# Patient Record
Sex: FEMALE | Race: WHITE | NOT HISPANIC OR LATINO | Employment: OTHER | URBAN - METROPOLITAN AREA
[De-identification: names, ages, dates, MRNs, and addresses within clinical notes are randomized per-mention and may not be internally consistent; named-entity substitution may affect disease eponyms.]

---

## 2018-09-26 ENCOUNTER — APPOINTMENT (OUTPATIENT)
Dept: PREADMISSION TESTING | Facility: HOSPITAL | Age: 68
End: 2018-09-26

## 2018-09-26 VITALS — WEIGHT: 168.65 LBS | BODY MASS INDEX: 28.79 KG/M2 | HEIGHT: 64 IN

## 2018-09-26 LAB
ANION GAP SERPL CALCULATED.3IONS-SCNC: 10 MMOL/L (ref 3–11)
BUN BLD-MCNC: 16 MG/DL (ref 9–23)
BUN/CREAT SERPL: 18 (ref 7–25)
CALCIUM SPEC-SCNC: 9.2 MG/DL (ref 8.7–10.4)
CHLORIDE SERPL-SCNC: 104 MMOL/L (ref 99–109)
CO2 SERPL-SCNC: 25 MMOL/L (ref 20–31)
CREAT BLD-MCNC: 0.89 MG/DL (ref 0.6–1.3)
DEPRECATED RDW RBC AUTO: 47.8 FL (ref 37–54)
ERYTHROCYTE [DISTWIDTH] IN BLOOD BY AUTOMATED COUNT: 14.7 % (ref 11.3–14.5)
GFR SERPL CREATININE-BSD FRML MDRD: 63 ML/MIN/1.73
GLUCOSE BLD-MCNC: 167 MG/DL (ref 70–100)
HCT VFR BLD AUTO: 36.7 % (ref 34.5–44)
HGB BLD-MCNC: 11.7 G/DL (ref 11.5–15.5)
MCH RBC QN AUTO: 28.5 PG (ref 27–31)
MCHC RBC AUTO-ENTMCNC: 31.9 G/DL (ref 32–36)
MCV RBC AUTO: 89.5 FL (ref 80–99)
PLATELET # BLD AUTO: 236 10*3/MM3 (ref 150–450)
PMV BLD AUTO: 10.2 FL (ref 6–12)
POTASSIUM BLD-SCNC: 4.4 MMOL/L (ref 3.5–5.5)
RBC # BLD AUTO: 4.1 10*6/MM3 (ref 3.89–5.14)
SODIUM BLD-SCNC: 139 MMOL/L (ref 132–146)
WBC NRBC COR # BLD: 7.26 10*3/MM3 (ref 3.5–10.8)

## 2018-09-26 PROCEDURE — 85027 COMPLETE CBC AUTOMATED: CPT | Performed by: PLASTIC SURGERY

## 2018-09-26 PROCEDURE — 36415 COLL VENOUS BLD VENIPUNCTURE: CPT

## 2018-09-26 PROCEDURE — 80048 BASIC METABOLIC PNL TOTAL CA: CPT | Performed by: PLASTIC SURGERY

## 2018-09-26 RX ORDER — ANASTROZOLE 1 MG/1
1 TABLET ORAL DAILY
COMMUNITY

## 2018-09-26 RX ORDER — PHENOL 1.4 %
600 AEROSOL, SPRAY (ML) MUCOUS MEMBRANE DAILY
COMMUNITY

## 2018-09-26 RX ORDER — LISINOPRIL 10 MG/1
10 TABLET ORAL DAILY
COMMUNITY

## 2018-10-01 ENCOUNTER — ANESTHESIA EVENT (OUTPATIENT)
Dept: PERIOP | Facility: HOSPITAL | Age: 68
End: 2018-10-01

## 2018-10-02 ENCOUNTER — HOSPITAL ENCOUNTER (OUTPATIENT)
Facility: HOSPITAL | Age: 68
Setting detail: HOSPITAL OUTPATIENT SURGERY
Discharge: HOME | End: 2018-10-02
Attending: PLASTIC SURGERY | Admitting: PLASTIC SURGERY

## 2018-10-02 ENCOUNTER — ANESTHESIA (OUTPATIENT)
Dept: PERIOP | Facility: HOSPITAL | Age: 68
End: 2018-10-02

## 2018-10-02 VITALS
DIASTOLIC BLOOD PRESSURE: 75 MMHG | HEIGHT: 64 IN | OXYGEN SATURATION: 95 % | SYSTOLIC BLOOD PRESSURE: 133 MMHG | RESPIRATION RATE: 16 BRPM | TEMPERATURE: 97.8 F | BODY MASS INDEX: 28.68 KG/M2 | WEIGHT: 168 LBS | HEART RATE: 97 BPM

## 2018-10-02 LAB
GLUCOSE BLDC GLUCOMTR-MCNC: 144 MG/DL (ref 70–130)
POTASSIUM BLDA-SCNC: 4.06 MMOL/L (ref 3.5–5.3)

## 2018-10-02 PROCEDURE — 25010000002 PROPOFOL 10 MG/ML EMULSION: Performed by: NURSE ANESTHETIST, CERTIFIED REGISTERED

## 2018-10-02 PROCEDURE — 25010000002 MIDAZOLAM PER 1 MG: Performed by: NURSE ANESTHETIST, CERTIFIED REGISTERED

## 2018-10-02 PROCEDURE — 25010000003 CEFAZOLIN IN DEXTROSE 2-4 GM/100ML-% SOLUTION: Performed by: PLASTIC SURGERY

## 2018-10-02 PROCEDURE — 25010000002 NEOSTIGMINE PER 0.5 MG: Performed by: NURSE ANESTHETIST, CERTIFIED REGISTERED

## 2018-10-02 PROCEDURE — 84132 ASSAY OF SERUM POTASSIUM: CPT | Performed by: PLASTIC SURGERY

## 2018-10-02 PROCEDURE — 25010000002 EPINEPHRINE PER 0.1 MG: Performed by: PLASTIC SURGERY

## 2018-10-02 PROCEDURE — 25010000002 FENTANYL CITRATE (PF) 100 MCG/2ML SOLUTION: Performed by: NURSE ANESTHETIST, CERTIFIED REGISTERED

## 2018-10-02 PROCEDURE — 25010000002 ONDANSETRON PER 1 MG: Performed by: NURSE ANESTHETIST, CERTIFIED REGISTERED

## 2018-10-02 PROCEDURE — 25010000002 PROMETHAZINE PER 50 MG: Performed by: NURSE ANESTHETIST, CERTIFIED REGISTERED

## 2018-10-02 PROCEDURE — 82962 GLUCOSE BLOOD TEST: CPT

## 2018-10-02 PROCEDURE — 25010000002 DEXAMETHASONE PER 1 MG: Performed by: NURSE ANESTHETIST, CERTIFIED REGISTERED

## 2018-10-02 PROCEDURE — 25010000002 PROPOFOL 1000 MG/ML EMULSION: Performed by: NURSE ANESTHETIST, CERTIFIED REGISTERED

## 2018-10-02 RX ORDER — GLYCOPYRROLATE 0.2 MG/ML
INJECTION INTRAMUSCULAR; INTRAVENOUS AS NEEDED
Status: DISCONTINUED | OUTPATIENT
Start: 2018-10-02 | End: 2018-10-02 | Stop reason: SURG

## 2018-10-02 RX ORDER — SCOLOPAMINE TRANSDERMAL SYSTEM 1 MG/1
1 PATCH, EXTENDED RELEASE TRANSDERMAL ONCE
Status: DISCONTINUED | OUTPATIENT
Start: 2018-10-02 | End: 2018-10-03 | Stop reason: HOSPADM

## 2018-10-02 RX ORDER — ESMOLOL HYDROCHLORIDE 10 MG/ML
INJECTION INTRAVENOUS AS NEEDED
Status: DISCONTINUED | OUTPATIENT
Start: 2018-10-02 | End: 2018-10-02 | Stop reason: SURG

## 2018-10-02 RX ORDER — CEFAZOLIN SODIUM 2 G/100ML
2 INJECTION, SOLUTION INTRAVENOUS ONCE
Status: COMPLETED | OUTPATIENT
Start: 2018-10-02 | End: 2018-10-02

## 2018-10-02 RX ORDER — MAGNESIUM HYDROXIDE 1200 MG/15ML
LIQUID ORAL AS NEEDED
Status: DISCONTINUED | OUTPATIENT
Start: 2018-10-02 | End: 2018-10-02 | Stop reason: HOSPADM

## 2018-10-02 RX ORDER — GINSENG 100 MG
CAPSULE ORAL AS NEEDED
Status: DISCONTINUED | OUTPATIENT
Start: 2018-10-02 | End: 2018-10-02 | Stop reason: HOSPADM

## 2018-10-02 RX ORDER — ATRACURIUM BESYLATE 10 MG/ML
INJECTION, SOLUTION INTRAVENOUS AS NEEDED
Status: DISCONTINUED | OUTPATIENT
Start: 2018-10-02 | End: 2018-10-02 | Stop reason: SURG

## 2018-10-02 RX ORDER — HYDROMORPHONE HYDROCHLORIDE 1 MG/ML
0.5 INJECTION, SOLUTION INTRAMUSCULAR; INTRAVENOUS; SUBCUTANEOUS
Status: DISCONTINUED | OUTPATIENT
Start: 2018-10-02 | End: 2018-10-03 | Stop reason: HOSPADM

## 2018-10-02 RX ORDER — PROMETHAZINE HYDROCHLORIDE 25 MG/1
25 TABLET ORAL ONCE AS NEEDED
Status: DISCONTINUED | OUTPATIENT
Start: 2018-10-02 | End: 2018-10-03 | Stop reason: HOSPADM

## 2018-10-02 RX ORDER — SODIUM CHLORIDE, SODIUM LACTATE, POTASSIUM CHLORIDE, CALCIUM CHLORIDE 600; 310; 30; 20 MG/100ML; MG/100ML; MG/100ML; MG/100ML
9 INJECTION, SOLUTION INTRAVENOUS CONTINUOUS PRN
Status: DISCONTINUED | OUTPATIENT
Start: 2018-10-02 | End: 2018-10-03 | Stop reason: HOSPADM

## 2018-10-02 RX ORDER — MEPERIDINE HYDROCHLORIDE 25 MG/ML
12.5 INJECTION INTRAMUSCULAR; INTRAVENOUS; SUBCUTANEOUS
Status: DISCONTINUED | OUTPATIENT
Start: 2018-10-02 | End: 2018-10-03 | Stop reason: HOSPADM

## 2018-10-02 RX ORDER — SODIUM CHLORIDE 0.9 % (FLUSH) 0.9 %
1-10 SYRINGE (ML) INJECTION AS NEEDED
Status: DISCONTINUED | OUTPATIENT
Start: 2018-10-02 | End: 2018-10-02 | Stop reason: HOSPADM

## 2018-10-02 RX ORDER — FENTANYL CITRATE 50 UG/ML
50 INJECTION, SOLUTION INTRAMUSCULAR; INTRAVENOUS
Status: DISCONTINUED | OUTPATIENT
Start: 2018-10-02 | End: 2018-10-03 | Stop reason: HOSPADM

## 2018-10-02 RX ORDER — PROMETHAZINE HYDROCHLORIDE 25 MG/ML
INJECTION, SOLUTION INTRAMUSCULAR; INTRAVENOUS AS NEEDED
Status: DISCONTINUED | OUTPATIENT
Start: 2018-10-02 | End: 2018-10-02 | Stop reason: SURG

## 2018-10-02 RX ORDER — PROMETHAZINE HYDROCHLORIDE 25 MG/ML
6.25 INJECTION, SOLUTION INTRAMUSCULAR; INTRAVENOUS ONCE AS NEEDED
Status: DISCONTINUED | OUTPATIENT
Start: 2018-10-02 | End: 2018-10-03 | Stop reason: HOSPADM

## 2018-10-02 RX ORDER — LIDOCAINE HYDROCHLORIDE AND EPINEPHRINE 10; 10 MG/ML; UG/ML
INJECTION, SOLUTION INFILTRATION; PERINEURAL AS NEEDED
Status: DISCONTINUED | OUTPATIENT
Start: 2018-10-02 | End: 2018-10-02 | Stop reason: HOSPADM

## 2018-10-02 RX ORDER — ONDANSETRON 2 MG/ML
INJECTION INTRAMUSCULAR; INTRAVENOUS AS NEEDED
Status: DISCONTINUED | OUTPATIENT
Start: 2018-10-02 | End: 2018-10-02 | Stop reason: SURG

## 2018-10-02 RX ORDER — MIDAZOLAM HYDROCHLORIDE 1 MG/ML
INJECTION INTRAMUSCULAR; INTRAVENOUS AS NEEDED
Status: DISCONTINUED | OUTPATIENT
Start: 2018-10-02 | End: 2018-10-02 | Stop reason: SURG

## 2018-10-02 RX ORDER — PROPOFOL 10 MG/ML
VIAL (ML) INTRAVENOUS AS NEEDED
Status: DISCONTINUED | OUTPATIENT
Start: 2018-10-02 | End: 2018-10-02 | Stop reason: SURG

## 2018-10-02 RX ORDER — PROMETHAZINE HYDROCHLORIDE 25 MG/1
25 SUPPOSITORY RECTAL ONCE AS NEEDED
Status: DISCONTINUED | OUTPATIENT
Start: 2018-10-02 | End: 2018-10-03 | Stop reason: HOSPADM

## 2018-10-02 RX ORDER — SODIUM CHLORIDE, SODIUM LACTATE, POTASSIUM CHLORIDE, AND CALCIUM CHLORIDE .6; .31; .03; .02 G/100ML; G/100ML; G/100ML; G/100ML
INJECTION, SOLUTION INTRAVENOUS AS NEEDED
Status: DISCONTINUED | OUTPATIENT
Start: 2018-10-02 | End: 2018-10-02 | Stop reason: HOSPADM

## 2018-10-02 RX ORDER — IPRATROPIUM BROMIDE AND ALBUTEROL SULFATE 2.5; .5 MG/3ML; MG/3ML
3 SOLUTION RESPIRATORY (INHALATION) ONCE AS NEEDED
Status: DISCONTINUED | OUTPATIENT
Start: 2018-10-02 | End: 2018-10-03 | Stop reason: HOSPADM

## 2018-10-02 RX ORDER — CEPHALEXIN 500 MG/1
500 CAPSULE ORAL 3 TIMES DAILY
Qty: 15 CAPSULE | Refills: 0 | Status: SHIPPED | OUTPATIENT
Start: 2018-10-02 | End: 2018-10-07

## 2018-10-02 RX ORDER — FENTANYL CITRATE 50 UG/ML
INJECTION, SOLUTION INTRAMUSCULAR; INTRAVENOUS AS NEEDED
Status: DISCONTINUED | OUTPATIENT
Start: 2018-10-02 | End: 2018-10-02 | Stop reason: SURG

## 2018-10-02 RX ORDER — DEXAMETHASONE SODIUM PHOSPHATE 4 MG/ML
INJECTION, SOLUTION INTRA-ARTICULAR; INTRALESIONAL; INTRAMUSCULAR; INTRAVENOUS; SOFT TISSUE AS NEEDED
Status: DISCONTINUED | OUTPATIENT
Start: 2018-10-02 | End: 2018-10-02 | Stop reason: SURG

## 2018-10-02 RX ORDER — LABETALOL HYDROCHLORIDE 5 MG/ML
5 INJECTION, SOLUTION INTRAVENOUS
Status: DISCONTINUED | OUTPATIENT
Start: 2018-10-02 | End: 2018-10-03 | Stop reason: HOSPADM

## 2018-10-02 RX ORDER — FAMOTIDINE 20 MG/1
20 TABLET, FILM COATED ORAL
Status: DISCONTINUED | OUTPATIENT
Start: 2018-10-02 | End: 2018-10-02 | Stop reason: HOSPADM

## 2018-10-02 RX ORDER — LIDOCAINE HYDROCHLORIDE 10 MG/ML
0.5 INJECTION, SOLUTION EPIDURAL; INFILTRATION; INTRACAUDAL; PERINEURAL ONCE AS NEEDED
Status: COMPLETED | OUTPATIENT
Start: 2018-10-02 | End: 2018-10-02

## 2018-10-02 RX ORDER — LIDOCAINE HYDROCHLORIDE 10 MG/ML
INJECTION, SOLUTION INFILTRATION; PERINEURAL AS NEEDED
Status: DISCONTINUED | OUTPATIENT
Start: 2018-10-02 | End: 2018-10-02 | Stop reason: SURG

## 2018-10-02 RX ADMIN — ATRACURIUM BESYLATE 10 MG: 10 INJECTION, SOLUTION INTRAVENOUS at 07:50

## 2018-10-02 RX ADMIN — ESMOLOL HYDROCHLORIDE 20 MG: 10 INJECTION INTRAVENOUS at 08:01

## 2018-10-02 RX ADMIN — ESMOLOL HYDROCHLORIDE 10 MG: 10 INJECTION INTRAVENOUS at 09:11

## 2018-10-02 RX ADMIN — FENTANYL CITRATE 100 MCG: 50 INJECTION, SOLUTION INTRAMUSCULAR; INTRAVENOUS at 07:39

## 2018-10-02 RX ADMIN — DEXAMETHASONE SODIUM PHOSPHATE 8 MG: 4 INJECTION, SOLUTION INTRAMUSCULAR; INTRAVENOUS at 07:45

## 2018-10-02 RX ADMIN — LIDOCAINE HYDROCHLORIDE 0.5 ML: 10 INJECTION, SOLUTION EPIDURAL; INFILTRATION; INTRACAUDAL; PERINEURAL at 06:22

## 2018-10-02 RX ADMIN — PROPOFOL 150 MG: 10 INJECTION, EMULSION INTRAVENOUS at 07:39

## 2018-10-02 RX ADMIN — ESMOLOL HYDROCHLORIDE 10 MG: 10 INJECTION INTRAVENOUS at 08:35

## 2018-10-02 RX ADMIN — ESMOLOL HYDROCHLORIDE 10 MG: 10 INJECTION INTRAVENOUS at 08:16

## 2018-10-02 RX ADMIN — MIDAZOLAM HYDROCHLORIDE 2 MG: 1 INJECTION, SOLUTION INTRAMUSCULAR; INTRAVENOUS at 07:33

## 2018-10-02 RX ADMIN — SCOPOLAMINE 1 PATCH: 1 PATCH, EXTENDED RELEASE TRANSDERMAL at 07:28

## 2018-10-02 RX ADMIN — ONDANSETRON 4 MG: 2 INJECTION INTRAMUSCULAR; INTRAVENOUS at 09:06

## 2018-10-02 RX ADMIN — PROMETHAZINE HYDROCHLORIDE 5 MG: 25 INJECTION INTRAMUSCULAR; INTRAVENOUS at 09:12

## 2018-10-02 RX ADMIN — LIDOCAINE HYDROCHLORIDE 50 MG: 10 INJECTION, SOLUTION INFILTRATION; PERINEURAL at 07:39

## 2018-10-02 RX ADMIN — Medication 3 MG: at 09:06

## 2018-10-02 RX ADMIN — GLYCOPYRROLATE 0.4 MG: 0.2 INJECTION, SOLUTION INTRAMUSCULAR; INTRAVENOUS at 09:06

## 2018-10-02 RX ADMIN — FAMOTIDINE 20 MG: 20 TABLET ORAL at 06:21

## 2018-10-02 RX ADMIN — PROPOFOL 100 MCG/KG/MIN: 10 INJECTION, EMULSION INTRAVENOUS at 07:39

## 2018-10-02 RX ADMIN — CEFAZOLIN SODIUM 2 G: 2 INJECTION, SOLUTION INTRAVENOUS at 07:32

## 2018-10-02 RX ADMIN — ATRACURIUM BESYLATE 40 MG: 10 INJECTION, SOLUTION INTRAVENOUS at 07:39

## 2018-10-02 RX ADMIN — FENTANYL CITRATE 50 MCG: 50 INJECTION, SOLUTION INTRAMUSCULAR; INTRAVENOUS at 09:11

## 2018-10-02 RX ADMIN — SODIUM CHLORIDE, POTASSIUM CHLORIDE, SODIUM LACTATE AND CALCIUM CHLORIDE 9 ML/HR: 600; 310; 30; 20 INJECTION, SOLUTION INTRAVENOUS at 06:22

## 2018-10-02 NOTE — ANESTHESIA POSTPROCEDURE EVALUATION
Patient: Kendy Forrester    Procedure Summary     Date:  10/02/18 Room / Location:   [facility] /  [facility]    Anesthesia Start:  0732 Anesthesia Stop:      Procedures:       BILATERAL RECONSTRUCTED BREAST REVISION WITH FAT GRAFTING (Bilateral )      SCAR RELEASE TO RIGHT AXILLA (ZPLASTY) (Right ) Diagnosis:      Surgeon:  Avi Hill MD Provider:  Chi Rosas MD    Anesthesia Type:  general ASA Status:  3          Anesthesia Type: general  Last vitals  /73   Temp 97.1   Pulse 97   Resp 15   SpO2 100     Post Anesthesia Care and Evaluation    Patient location during evaluation: PACU  Patient participation: complete - patient participated  Level of consciousness: awake and alert  Pain score: 0  Pain management: adequate  Airway patency: patent  Anesthetic complications: No anesthetic complications  PONV Status: none  Cardiovascular status: hemodynamically stable and acceptable  Respiratory status: nonlabored ventilation, acceptable and nasal cannula  Hydration status: acceptable

## 2018-10-02 NOTE — BRIEF OP NOTE
FAT GRAFTING, UPPER EXTREMITY LESION/CYST EXCISION  Progress Note    Kendy Forrester  10/2/2018    Pre-op Diagnosis:   1. Bilateral reconstructed breast asymmetry  2. 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       Post-Op Diagnosis Codes:   same    Procedure/CPT® Codes:      Procedure(s):  BILATERAL RECONSTRUCTED BREAST REVISION WITH FAT GRAFTING  SCAR RELEASE TO RIGHT AXILLA (ZPLASTY)    Surgeon(s):  Avi Hill MD    Anesthesia: General    Staff:   Circulator: Stefanie Santos RN  Scrub Person: Nishant Fleming    Estimated Blood Loss: 15 mL    Urine Voided: * No values recorded between 10/2/2018  7:32 AM and 10/2/2018  9:21 AM *    Specimens:                None      Drains:  none    Findings: 66 mL of fat grafted to left breast, 81 mL of fat grafted to right breast; Z plasty tissue rearrangement of 6cm    Complications: none immediate      Avi Hill MD     Date: 10/2/2018  Time: 9:25 AM

## 2018-10-02 NOTE — ANESTHESIA PROCEDURE NOTES
Airway  Urgency: elective    Airway not difficult    General Information and Staff    Patient location during procedure: OR  CRNA: DAYNE CHOW    Indications and Patient Condition  Indications for airway management: airway protection    Preoxygenated: yes  MILS not maintained throughout  Mask difficulty assessment: 2 - vent by mask + OA or adjuvant +/- NMBA    Final Airway Details  Final airway type: endotracheal airway      Successful airway: ETT  Cuffed: yes   Successful intubation technique: direct laryngoscopy  Facilitating devices/methods: intubating stylet  Endotracheal tube insertion site: oral  Blade: Rachel  Blade size: 3  ETT size: 7.0 mm  Cormack-Lehane Classification: grade I - full view of glottis  Placement verified by: chest auscultation and capnometry   Cuff volume (mL): 6  Measured from: lips  ETT to lips (cm): 20  Number of attempts at approach: 1    Additional Comments  Patient history, labs, physical exam, anesthetic plan reviewed with MDA and patient in preop.  Pt transported to room via RN. All ASA monitors applied, preoxygenated x 3 min/ ETO2 of >85, IV patent, smooth induction, easy intubation, + ETCO2, negative epigastric sounds, breath sound equal bilaterally with symmetric chest rise and fall, tube secured, all VSS, cspine neutrality maintained throughout induction, intubation and postitioning, all ppp, arms <90.

## 2018-10-02 NOTE — H&P
"Pre-Op H&P  Kendy Forrester  [id number]  1950    Chief complaint: History of breast cancer    HPI:    Patient is a 68 y.o.female who presents with history of breast cancer and here today for bilateral reconstructed breast revision with fat grafting, scar release to right.    Review of Systems:  General ROS: negative for chills, fever or skin lesions;  No changes since last office visit  Cardiovascular ROS: no chest pain or dyspnea on exertion  Respiratory ROS: no cough, shortness of breath, or wheezing    Allergies: No Known Allergies    Home Meds:    No current facility-administered medications on file prior to encounter.      No current outpatient prescriptions on file prior to encounter.       PMH:   Past Medical History:   Diagnosis Date   • Cancer (CMS/HCC)     Breast cancer    • Diabetes mellitus (CMS/HCC)    • History of chemotherapy    • History of radiation therapy    • Hypertension    • PONV (postoperative nausea and vomiting)    • Wears dentures    • Wears glasses      PSH:    Past Surgical History:   Procedure Laterality Date   • BREAST RECONSTRUCTION  2018    replace expanders with permanent implants    •  SECTION      x 2   • HYSTERECTOMY     • LAPAROSCOPIC CHOLECYSTECTOMY     • MASTECTOMY Bilateral 2017    bilateral mastectomy with sentinel node bx on right; insertion of expanders   • TONSILLECTOMY         Social History:   Tobacco:   History   Smoking Status   • Former Smoker   • Packs/day: 1.00   • Years: 40.00   • Types: Cigarettes   • Quit date: 2005   Smokeless Tobacco   • Never Used      Alcohol:     History   Alcohol Use No       Vitals:           /76 (BP Location: Right arm, Patient Position: Lying)   Pulse 79   Temp 97.6 °F (36.4 °C) (Tympanic)   Resp 18   Ht 162.6 cm (64\")   Wt 76.2 kg (168 lb)   SpO2 98%   BMI 28.84 kg/m²     Physical Exam:  General Appearance:    Alert, cooperative, no distress, appears stated age   Head:    Normocephalic, without obvious " abnormality, atraumatic   Lungs:     Clear to auscultation bilaterally, respirations unlabored    Heart:   Regular rate and rhythm, S1 and S2 normal, no murmur, rub    or gallop    Abdomen:    Soft, non-tender.  +bowel sounds   Breast Exam:    deferred   Genitalia:    deferred   Extremities:   Extremities normal, atraumatic, no cyanosis or edema   Skin:   Skin color, texture, turgor normal, no rashes or lesions   Neurologic:   Grossly intact   Results Review  I reviewed the patient's new clinical results.    Cancer Staging (if applicable)  Cancer Patient: __ yes _x_no __unknown; If yes, clinical stage T:__ N:__M:__, stage group or __N/A    Impression: History of breast cancer s/p bilateral mastectomy with right SNB, insertion of expanders and replacement of expanders to implants    Plan: Bilateral reconstructed breast revision with fat grafting, scar release to axilla    Soila Lozano, REBEKAH   10/2/2018   6:36 AM

## 2018-10-02 NOTE — ANESTHESIA PREPROCEDURE EVALUATION
Anesthesia Evaluation     Patient summary reviewed and Nursing notes reviewed   history of anesthetic complications:  NPO Solid Status: > 8 hours  NPO Liquid Status: > 8 hours           Airway   Mallampati: I  TM distance: >3 FB  Neck ROM: full  No difficulty expected  Dental    (+) upper dentures    Pulmonary    (+) a smoker (05) Former,   (-) COPD, asthma, shortness of breath  Cardiovascular     (+) hypertension,   (-) past MI, CAD, angina      Neuro/Psych  (-) seizures, TIA  GI/Hepatic/Renal/Endo    (+)   diabetes mellitus,   (-) liver disease, no renal disease    Musculoskeletal     Abdominal    Substance History      OB/GYN          Other      history of cancer    ROS/Med Hx Other: -160                 Anesthesia Plan    ASA 3     general   (Propofol Infusion as part of Anti PONV tech )  intravenous induction   Anesthetic plan, all risks, benefits, and alternatives have been provided, discussed and informed consent has been obtained with: patient.    Plan discussed with CRNA.

## 2018-10-02 NOTE — OP NOTE
DATE OF PROCEDURE: 10/02/18    PREOPERATIVE DIAGNOSIS:   1. Bilateral reconstructed breast asymmetry.   2. 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      POSTOPERATIVE DIAGNOSIS:   1. Bilateral reconstructed breast asymmetry.   2. 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      PROCEDURES PERFORMED:  1. Revision of reconstructed breasts with fat grafting.  2. Z plasty scar release to right axilla, 6 cm.      SURGEON: Avi Hill MD     ASSISTANT: none     ANESTHESIA: General.     INDICATIONS: The patient is a 68-year-old female who had a history of breast cancer for which she underwent bilateral mastectomies, as well as tissue expander placement. She subsequently underwent expansion with exchange of expanders to permanent implants. She additionally had radiation to her right chest and has healed from that.  She continued to have asymmetry with her reconstructed breasts, especially in the superior and medial poles, and therefore presented today for fat grafting of those poles. She also has developed a right axillary scar contracture after her radiation with an obvious scar band. This is limiting her range of motion in her shoulder. The patient understood all of the risks and benefits of the procedure, including fat necrosis, oil cysts, need for future procedures, and elected to proceed.      FINDINGS:   1.Placement of 81 mL of fat to the right reconstructed breast superior and medial poles. Placement of 66 mL of fat to the left breast.  2. Local tissue rearrangement Z plasty scar release of 6 cm2.      DESCRIPTION OF PROCEDURE: The patient was seen in preoperative holding, marked in a standing position and taken to the operating room by anesthesia after informed consent was signed and on the chart. The patient was placed supine on the operating room table and general anesthesia was induced. Once verified, the case was then turned over to the surgical service. The patient had her chest and abdomen  prepped and draped in a normal sterile fashion. A timeout was called and all parties were in agreement, including nursing and anesthesia. Preoperative antibiotics were given. We began by making 2 small poke holes, stab incisions with a #15 blade scalpel in the lower right and left quadrants for which we infiltrated our tumescent solution which consisted of a liter of lactated ringer and 30 mL of 1% plain Lidocaine and an ampule of 1:1000 epinephrine. This was infiltrated to the abdomen. After allowing sufficient time for this to work, we began with liposuction of her entire abdomen. We infiltrated approximately 7000cc of tumescent solution and we removed approximately 700 mL of lipoaspirate. The fat was then washed and spun in the Driv[employer] System, and we began by fat grafting after making a small stab incision on the #15 blade scalpel to her left and right reconstructed breasts. We fat grafted to the superior and medial poles of both reconstructed breasts. At this point, she was sat upright and noted to have better improved symmetry between both breasts, and laid back down. The small incisions were then closed with 5-0 nylon in a simple interrupted fashion.    I then performed the Z plasty scar release of the right axillary scar contracture. The scar was 2 cm and therefore a Z plasty of equal length limbs was drawn with 60 degree angles. A 15 blade scalpel was used to make the incisions and elevate the limbs. They were transposed into the defect to lengthen the scar in a classic Z plasty pattern. They were then sutured into place with 5-0 Nylon in interrupted fashion.      She was then extubated, had Kerlix fluffs and a surgical bra, as well as abdominal binder placed on her, awoken and taken to PACU in stable condition. All sponge and instrument counts were correct. I, Dr. Avi Hill, was present and scrubbed for the entire procedure.      SPECIMEN: Lipoaspirate.      ESTIMATED BLOOD LOSS: 5cc.     DRAINS: None.       COMPLICATIONS: None immediate.       Avi Hill MD  10/02/18  9:28 AM

## 2021-03-12 ENCOUNTER — IMMUNIZATION (OUTPATIENT)
Dept: VACCINE CLINIC | Facility: HOSPITAL | Age: 71
End: 2021-03-12

## 2021-03-12 PROCEDURE — 0001A: CPT | Performed by: INTERNAL MEDICINE

## 2021-03-12 PROCEDURE — 91300 HC SARSCOV02 VAC 30MCG/0.3ML IM: CPT | Performed by: INTERNAL MEDICINE

## 2021-04-02 ENCOUNTER — IMMUNIZATION (OUTPATIENT)
Dept: VACCINE CLINIC | Facility: HOSPITAL | Age: 71
End: 2021-04-02

## 2021-04-02 PROCEDURE — 91300 HC SARSCOV02 VAC 30MCG/0.3ML IM: CPT | Performed by: INTERNAL MEDICINE

## 2021-04-02 PROCEDURE — 0002A: CPT | Performed by: INTERNAL MEDICINE

## 2023-07-20 ENCOUNTER — TREATMENT (OUTPATIENT)
Dept: PHYSICAL THERAPY | Facility: CLINIC | Age: 73
End: 2023-07-20
Payer: MEDICARE

## 2023-07-20 DIAGNOSIS — G89.29 CHRONIC LEFT SHOULDER PAIN: Primary | ICD-10-CM

## 2023-07-20 DIAGNOSIS — R27.8 MUSCULAR INCOORDINATION: ICD-10-CM

## 2023-07-20 DIAGNOSIS — M25.512 CHRONIC LEFT SHOULDER PAIN: Primary | ICD-10-CM

## 2023-07-20 DIAGNOSIS — R53.1 WEAKNESS: ICD-10-CM

## 2023-07-27 ENCOUNTER — TELEPHONE (OUTPATIENT)
Dept: PHYSICAL THERAPY | Facility: CLINIC | Age: 73
End: 2023-07-27

## 2023-08-03 ENCOUNTER — TREATMENT (OUTPATIENT)
Dept: PHYSICAL THERAPY | Facility: CLINIC | Age: 73
End: 2023-08-03
Payer: MEDICARE

## 2023-08-03 DIAGNOSIS — G89.29 CHRONIC LEFT SHOULDER PAIN: Primary | ICD-10-CM

## 2023-08-03 DIAGNOSIS — R27.8 MUSCULAR INCOORDINATION: ICD-10-CM

## 2023-08-03 DIAGNOSIS — M25.512 CHRONIC LEFT SHOULDER PAIN: Primary | ICD-10-CM

## 2023-08-03 DIAGNOSIS — R53.1 WEAKNESS: ICD-10-CM

## 2023-08-10 NOTE — PROGRESS NOTES
Physical Therapy Daily Treatment Note  [address] [facility], [address], [facility] [address]      Patient: Kendy Forrester   : 1950  Referring practitioner: Yuan Oshea MD  Date of Initial Visit: Type: THERAPY  Noted: 2023  Today's Date: 8/10/2023  Patient seen for 5 sessions       Visit Diagnoses:    ICD-10-CM ICD-9-CM   1. Chronic left shoulder pain  M25.512 719.41    G89.29 338.29   2. Weakness  R53.1 780.79   3. Muscular incoordination  R27.8 781.3       Subjective:    Patient reports her shoulder continues to improve.  States she has noted dec spasm of the UT and Lev.  Reports she did have a mild inc in lateral shoulder pain with OH activities over the weekend.  Denies any new complaints.     Objective:    Left Shoulder: Flex 150, Abd 150, IR T8, ER 65        See Exercise, Manual, and Modality Logs for complete treatment.       A/P:    Pt rigoberto today's visit moderately.  Pt verbalized sensation of dec blood glucose following participation in UBE.  Provided candy which pt verbalized improved symptoms.  Hold on ther ex due to hypoglycemic event.  Man intervention to facilitate improved GH jt and sh mobility.  Pt rigoberto very well.  Will resume TE next visit if appropriate.   Continue with POT progressing as rigoberto.  Next appt 23    Timed:         Manual Therapy:    30     mins  57452;     Therapeutic Exercise:    0     mins  59385;     Neuromuscular Airam:    0    mins  41893;    Therapeutic Activity:     0     mins  76843;     Gait Trainin     mins  95295;     Ultrasound:     0     mins  36919;    Ionto                               0    mins   58467  Self Care                       0     mins   41171      Un-Timed:  Electrical Stimulation:    0     mins  85665 ( );  Dry Needling     0     mins self-pay  Traction     0     mins 26449      Timed Treatment:   30   mins   Total Treatment:     30   mins    Kelvin Mcdermott, PT  [place] License: [id number]

## 2023-08-11 ENCOUNTER — TREATMENT (OUTPATIENT)
Dept: PHYSICAL THERAPY | Facility: CLINIC | Age: 73
End: 2023-08-11
Payer: MEDICARE

## 2023-08-11 DIAGNOSIS — R53.1 WEAKNESS: ICD-10-CM

## 2023-08-11 DIAGNOSIS — M25.512 CHRONIC LEFT SHOULDER PAIN: Primary | ICD-10-CM

## 2023-08-11 DIAGNOSIS — R27.8 MUSCULAR INCOORDINATION: ICD-10-CM

## 2023-08-11 DIAGNOSIS — G89.29 CHRONIC LEFT SHOULDER PAIN: Primary | ICD-10-CM

## 2023-08-11 NOTE — PROGRESS NOTES
Physical Therapy Daily Treatment Note  [address] [facility], [address], [place] [id number]      Patient: Kendy Forrester   : 1950  Referring practitioner: Yuan Oshea MD  Date of Initial Visit: Type: THERAPY  Noted: 2023  Today's Date: 2023  Patient seen for 6 sessions       Visit Diagnoses:    ICD-10-CM ICD-9-CM   1. Chronic left shoulder pain  M25.512 719.41    G89.29 338.29   2. Weakness  R53.1 780.79   3. Muscular incoordination  R27.8 781.3       Subjective:    Patient reports her shoulder and neck have been doing well.  States she continues to have inc pain over the lateral scapular region.  States the UT is feeling better.  Reports HEP compliance.  Denies any new complaints.     Objective:    Left Shoulder: Flex 150, Abd 140, IR T8, ER 65      See Exercise, Manual, and Modality Logs for complete treatment.       A/P:    Pt continues to rigoberto rx well. Able to resume TE at todays appt.  Pt rigoberto well.  Mild soreness over the lat/IS.  This responds well to combo man and TE.  Continued emphasis on postural correction, dynamic stability of scapula and functional loading mechanics.  Pt able to complete rx without complaint.  Responds well to man st.  Concluded with DN to MTrP of the Lat sh region.  Will assess rigoberto to rx next visit.   Continue with POT progressing as rigoberto.  Next appt 23    Timed:         Manual Therapy:    10     mins  84827;     Therapeutic Exercise:    15     mins  57548;     Neuromuscular Airam:    15    mins  82431;    Therapeutic Activity:     0     mins  68210;     Gait Trainin     mins  65472;     Ultrasound:     0     mins  04757;    Ionto                               0    mins   98675  Self Care                       0     mins   52794      Un-Timed:  Electrical Stimulation:    0     mins  35666 (MC );  Dry Needling     0     mins self-pay  Traction     0     mins 15147      Timed Treatment:   40   mins   Total Treatment:     40   mins    Kelvin Mcdermott  PT  [place] License: [id number]

## 2023-08-22 ENCOUNTER — TREATMENT (OUTPATIENT)
Dept: PHYSICAL THERAPY | Facility: CLINIC | Age: 73
End: 2023-08-22
Payer: MEDICARE

## 2023-08-22 DIAGNOSIS — R27.8 MUSCULAR INCOORDINATION: ICD-10-CM

## 2023-08-22 DIAGNOSIS — M25.512 CHRONIC LEFT SHOULDER PAIN: Primary | ICD-10-CM

## 2023-08-22 DIAGNOSIS — R53.1 WEAKNESS: ICD-10-CM

## 2023-08-22 DIAGNOSIS — G89.29 CHRONIC LEFT SHOULDER PAIN: Primary | ICD-10-CM

## 2023-08-22 NOTE — PROGRESS NOTES
Physical Therapy Daily Treatment Note  [address] [facility], [address], [place] [id number]      Patient: Kendy Forrester   : 1950  Referring practitioner: Yuan Oshea MD  Date of Initial Visit: Type: THERAPY  Noted: 2023  Today's Date: 2023  Patient seen for 7 sessions       Visit Diagnoses:  No diagnosis found.    Subjective:    Patient reports her shoulder continues to improve.  States primary limitation is with reaching into horiz add with elevation.  States she has been compliant with her HEP.  No new complaints.     Objective:    Left Shoulder: Flex 150, Abd 140, IR T8, ER 65  MTrP Mid Delt       See Exercise, Manual, and Modality Logs for complete treatment.       A/P:    Patient continues to tolerate treatment well.  Appreciated continued improvements in range of motion and muscle tone at the onset of treatment.  Upper trapezius and cervical myofascial trigger points appear to have resolved.  Appreciated mild myofascial trigger point to the middle dell.  This responds well to dry needling intervention.  Patient continues to benefit from internal and external rotation stretching as well as pectoralis minor stretching.  Updated therapeutic exercise.  Patient able to complete without complaint.  Discussed beginning to transition to home exercise based intervention.  We will increase duration between treatment sessions.  We will assess tolerance to treatment and increase duration next visit.  Continue with plan of treatment as written.  Next appointment 2023.    Timed:         Manual Therapy:    15     mins  41132;     Therapeutic Exercise:    15     mins  86632;     Neuromuscular Airam:    15    mins  26393;    Therapeutic Activity:     00     mins  33252;     Gait Trainin     mins  96947;     Ultrasound:     0     mins  88583;    Ionto                               0    mins   01922  Self Care                       0     mins   04195      Un-Timed:  Electrical Stimulation:    0      mins  78661 ( );  Dry Needling     0     mins self-pay  Traction     0     mins 04753      Timed Treatment:   45   mins   Total Treatment:     45   mins    Kelvin Mcdermott, PT  [place] License: [id number]

## 2023-09-06 ENCOUNTER — TREATMENT (OUTPATIENT)
Dept: PHYSICAL THERAPY | Facility: CLINIC | Age: 73
End: 2023-09-06
Payer: MEDICARE

## 2023-09-06 DIAGNOSIS — M25.512 CHRONIC LEFT SHOULDER PAIN: Primary | ICD-10-CM

## 2023-09-06 DIAGNOSIS — G89.29 CHRONIC LEFT SHOULDER PAIN: Primary | ICD-10-CM

## 2023-09-06 DIAGNOSIS — R27.8 MUSCULAR INCOORDINATION: ICD-10-CM

## 2023-09-06 DIAGNOSIS — R53.1 WEAKNESS: ICD-10-CM

## 2023-09-06 NOTE — PROGRESS NOTES
Physical Therapy Daily Treatment Note  [address] [facility], [address], [facility] [address]      Patient: Kendy Forrester   : 1950  Referring practitioner: Yuan Oshea MD  Date of Initial Visit: Type: THERAPY  Noted: 2023  Today's Date: 2023  Patient seen for 8 sessions       Visit Diagnoses:    ICD-10-CM ICD-9-CM   1. Chronic left shoulder pain  M25.512 719.41    G89.29 338.29   2. Weakness  R53.1 780.79   3. Muscular incoordination  R27.8 781.3       Subjective:    Patient reports her neck and shoulder have been doing well.  Reports she has been compliant with her HEP.  Reports no new complaints.  Denies any HA since last visit.     Objective:    Left Shoulder: Flex 150, Abd 150, IR T8, ER 65  Left Shoulder MMT: Flex 5/5, Abd 5/5, IR 5/5, ER 5/5  Palpation: No MTrP L UT or deltoid       See Exercise, Manual, and Modality Logs for complete treatment.       A/P:    Pt has responded well to skilled PT.  CROM and shoulder ROM WNL at this time.  MTrP and HA have resolved.  Pt is ind with HEP.  She is able to self manage.  She is to be placed on hol at this time.  All exercises were reviewed.  T to call with questions or concerns.  If no further contact, pt to d/c on 10/6/23 having met all goals.     Timed:         Manual Therapy:    10     mins  24341;     Therapeutic Exercise:    30     mins  84726;     Neuromuscular Airam:    0    mins  09140;    Therapeutic Activity:     0     mins  45906;     Gait Trainin     mins  96690;     Ultrasound:     0     mins  42121;    Ionto                               0    mins   11948  Self Care                       0     mins   15502      Un-Timed:  Electrical Stimulation:    0     mins  81816 ( );  Dry Needling     0     mins self-pay  Traction     0     mins 41125      Timed Treatment:   40   mins   Total Treatment:     40   mins    Kelvin Mcdermott PT  [place] License: [id number]

## 2025-02-04 ENCOUNTER — TRANSCRIBE ORDERS (OUTPATIENT)
Dept: PHYSICAL THERAPY | Facility: CLINIC | Age: 75
End: 2025-02-04
Payer: COMMERCIAL

## 2025-02-04 DIAGNOSIS — M54.31 SCIATIC PAIN, RIGHT: Primary | ICD-10-CM

## (undated) DEVICE — SPNG GZ WOVN 4X4IN 12PLY 10/BX STRL

## (undated) DEVICE — SYR LUERLOK 20CC

## (undated) DEVICE — ENCORE® LATEX MICRO SIZE 7, STERILE LATEX POWDER-FREE SURGICAL GLOVE: Brand: ENCORE

## (undated) DEVICE — GLV SURG BIOGEL LTX PF 7 1/2

## (undated) DEVICE — SUT ETHLN 5/0 PC3 PLSTC 18IN 1865G

## (undated) DEVICE — BNDR ABD 4PANEL 12IN 46 TO 62IN

## (undated) DEVICE — PK MINOR SPLT 10

## (undated) DEVICE — Device

## (undated) DEVICE — GLV SURG GRN DERMASSURE LF PF 7.5

## (undated) DEVICE — DRSNG WND BORDR/ADHS NONADHR/GZ LF 2X2IN STRL

## (undated) DEVICE — BRA COMPR SURG STL958 XL

## (undated) DEVICE — PROXIMATE RH ROTATING HEAD SKIN STAPLERS (35 WIDE) CONTAINS 35 STAINLESS STEEL STAPLES: Brand: PROXIMATE

## (undated) DEVICE — DRSNG TELFA ILND ADH 4X6IN

## (undated) DEVICE — GLV SURG SENSICARE MICRO PF LF 7 STRL

## (undated) DEVICE — CANNULA,OXY,ADULT,SUPERSOFT,W/7'TUB,UC: Brand: MEDLINE

## (undated) DEVICE — ADAPT ST INFUS ADMIN SYR 70IN

## (undated) DEVICE — LINER CANSTR SXN HERCULES

## (undated) DEVICE — ELECTRD BLD EDGE/INSUL1P 2.4X5.1MM STRL

## (undated) DEVICE — SYS FAT GRAFTING REVOLVE SGL

## (undated) DEVICE — SUT ETHLN 4/0 PC3 18IN 1864G

## (undated) DEVICE — INTENDED USE FOR SURGICAL MARKING ON INTACT SKIN, ALSO PROVIDES A PERMANENT METHOD OF IDENTIFYING OBJECTS IN THE OPERATING ROOM: Brand: WRITESITE® REGULAR TIP SKIN MARKER

## (undated) DEVICE — MEDI-VAC NON-CONDUCTIVE SUCTION TUBING: Brand: CARDINAL HEALTH

## (undated) DEVICE — MEDI-VAC YANKAUER SUCTION HANDLE W/BULBOUS TIP: Brand: CARDINAL HEALTH

## (undated) DEVICE — APPL CHLORAPREP W/TINT 26ML BLU

## (undated) DEVICE — TBG SXN LIPECTOMY 108IN

## (undated) DEVICE — AIRWY 90MM NO9

## (undated) DEVICE — NDL HYPO SFTY PROEDGE 27G 1 1/4IN GRY

## (undated) DEVICE — COVER,LIGHT HANDLE,FLX,1/PK: Brand: MEDLINE INDUSTRIES, INC.